# Patient Record
Sex: MALE | NOT HISPANIC OR LATINO | URBAN - METROPOLITAN AREA
[De-identification: names, ages, dates, MRNs, and addresses within clinical notes are randomized per-mention and may not be internally consistent; named-entity substitution may affect disease eponyms.]

---

## 2018-06-15 ENCOUNTER — OFFICE VISIT (OUTPATIENT)
Dept: URBAN - METROPOLITAN AREA CLINIC 17 | Facility: CLINIC | Age: 68
End: 2018-06-15
Payer: MEDICARE

## 2018-06-15 PROCEDURE — 99214 OFFICE O/P EST MOD 30 MIN: CPT | Performed by: OPHTHALMOLOGY

## 2018-06-15 RX ORDER — OFLOXACIN 3 MG/ML
0.3 % SOLUTION/ DROPS OPHTHALMIC
Qty: 1 | Refills: 1 | Status: INACTIVE
Start: 2018-06-15 | End: 2018-08-07

## 2018-06-15 RX ORDER — PREDNISOLONE ACETATE 10 MG/ML
1 % SUSPENSION/ DROPS OPHTHALMIC
Qty: 1 | Refills: 1 | Status: INACTIVE
Start: 2018-06-15 | End: 2018-08-07

## 2018-06-15 RX ORDER — BROMFENAC 1.03 MG/ML
0.09 % SOLUTION/ DROPS OPHTHALMIC
Qty: 1 | Refills: 1 | Status: INACTIVE
Start: 2018-06-15 | End: 2018-08-07

## 2018-06-15 ASSESSMENT — VISUAL ACUITY
OD: 20/100
OS: 20/30

## 2018-06-15 ASSESSMENT — KERATOMETRY
OS: 43.25
OD: 42.75

## 2018-06-15 ASSESSMENT — INTRAOCULAR PRESSURE
OD: 18
OS: 22

## 2018-06-15 NOTE — IMPRESSION/PLAN
Impression: Diagnosis: Puckering of macula, right eye. Code: R55.389. OD. Plan: Discussed diagnosis in detail with patient. Advised patient of condition. Advised pt condition is also affecting vision. Recommend doing CEIOL OD and then seeing Dr Sb Guerrero for [facility] treatment.

## 2018-06-15 NOTE — IMPRESSION/PLAN
Impression: Age-related nuclear cataract, bilateral: H25.13. Symptoms: could improve with surgery. Vision: vision affected OD>OS Plan: Cataract accounts for patient's complaints. Reviewed risks, benefits, and procedure. Patient desires surgery, schedule ce/iol OD, RL2, discussed lens options, distance refractive target, patient is clear for surgery in [place] [id number]. Advised pt due to macular pucker pt is at a greater risk to devloping swelling. Pt to use Steroid and NSAID for 6-8 weeks after surgery.

## 2018-06-20 ENCOUNTER — PRE-OPERATIVE VISIT (OUTPATIENT)
Dept: URBAN - METROPOLITAN AREA CLINIC 17 | Facility: CLINIC | Age: 68
End: 2018-06-20
Payer: MEDICARE

## 2018-06-20 DIAGNOSIS — H25.13 AGE-RELATED NUCLEAR CATARACT, BILATERAL: Primary | ICD-10-CM

## 2018-06-20 PROCEDURE — 92136 OPHTHALMIC BIOMETRY: CPT | Performed by: OPHTHALMOLOGY

## 2018-06-20 ASSESSMENT — PACHYMETRY
OD: 23.90
OD: 2.48
OS: 2.53

## 2018-07-02 ENCOUNTER — SURGERY (OUTPATIENT)
Dept: URBAN - METROPOLITAN AREA SURGERY 7 | Facility: SURGERY | Age: 68
End: 2018-07-02
Payer: COMMERCIAL

## 2018-07-02 PROCEDURE — 66984 XCAPSL CTRC RMVL W/O ECP: CPT | Performed by: OPHTHALMOLOGY

## 2018-07-03 ENCOUNTER — POST-OPERATIVE VISIT (OUTPATIENT)
Dept: URBAN - METROPOLITAN AREA CLINIC 17 | Facility: CLINIC | Age: 68
End: 2018-07-03

## 2018-07-03 PROCEDURE — 99024 POSTOP FOLLOW-UP VISIT: CPT | Performed by: OPHTHALMOLOGY

## 2018-07-03 ASSESSMENT — INTRAOCULAR PRESSURE
OD: 19
OS: 18

## 2018-07-13 ENCOUNTER — POST-OPERATIVE VISIT (OUTPATIENT)
Dept: URBAN - METROPOLITAN AREA CLINIC 17 | Facility: CLINIC | Age: 68
End: 2018-07-13

## 2018-07-13 PROCEDURE — 99024 POSTOP FOLLOW-UP VISIT: CPT | Performed by: OPTOMETRIST

## 2018-07-13 ASSESSMENT — INTRAOCULAR PRESSURE
OS: 17
OD: 14

## 2018-07-13 ASSESSMENT — VISUAL ACUITY: OD: 20/50

## 2018-08-07 ENCOUNTER — POST-OPERATIVE VISIT (OUTPATIENT)
Dept: URBAN - METROPOLITAN AREA CLINIC 17 | Facility: CLINIC | Age: 68
End: 2018-08-07

## 2018-08-07 RX ORDER — BROMFENAC 1.03 MG/ML
0.09 % SOLUTION/ DROPS OPHTHALMIC
Qty: 1 | Refills: 0 | Status: INACTIVE
Start: 2018-08-07 | End: 2018-09-11

## 2018-08-07 ASSESSMENT — INTRAOCULAR PRESSURE
OS: 20
OD: 15

## 2018-08-07 ASSESSMENT — VISUAL ACUITY: OD: 20/50

## 2018-09-11 ENCOUNTER — OFFICE VISIT (OUTPATIENT)
Dept: URBAN - METROPOLITAN AREA CLINIC 17 | Facility: CLINIC | Age: 68
End: 2018-09-11
Payer: COMMERCIAL

## 2018-09-11 DIAGNOSIS — E10.3292 TYPE 1 DIAB W MILD NONPRLF DIABETIC RTNOP W/O MACULAR EDEMA, LEFT EYE: ICD-10-CM

## 2018-09-11 DIAGNOSIS — E10.3211: ICD-10-CM

## 2018-09-11 DIAGNOSIS — H35.371 PUCKERING OF MACULA, RIGHT EYE: Primary | ICD-10-CM

## 2018-09-11 PROCEDURE — 92134 CPTRZ OPH DX IMG PST SGM RTA: CPT | Performed by: OPHTHALMOLOGY

## 2018-09-11 PROCEDURE — 92014 COMPRE OPH EXAM EST PT 1/>: CPT | Performed by: OPHTHALMOLOGY

## 2018-09-11 RX ORDER — OFLOXACIN 3 MG/ML
0.3 % SOLUTION/ DROPS OPHTHALMIC
Qty: 5 | Refills: 3 | Status: ACTIVE
Start: 2018-09-11

## 2018-09-11 RX ORDER — PREDNISOLONE ACETATE 10 MG/ML
1 % SUSPENSION/ DROPS OPHTHALMIC
Qty: 10 | Refills: 5 | Status: ACTIVE
Start: 2018-09-11

## 2018-09-11 ASSESSMENT — INTRAOCULAR PRESSURE
OS: 20
OD: 19

## 2018-10-09 ENCOUNTER — SURGERY (OUTPATIENT)
Dept: URBAN - METROPOLITAN AREA SURGERY 7 | Facility: SURGERY | Age: 68
End: 2018-10-09
Payer: COMMERCIAL

## 2018-10-09 PROCEDURE — 67042 VIT FOR MACULAR HOLE: CPT | Performed by: OPHTHALMOLOGY

## 2018-10-10 ENCOUNTER — POST-OPERATIVE VISIT (OUTPATIENT)
Dept: URBAN - METROPOLITAN AREA CLINIC 17 | Facility: CLINIC | Age: 68
End: 2018-10-10

## 2018-10-10 PROCEDURE — 99024 POSTOP FOLLOW-UP VISIT: CPT | Performed by: OPTOMETRIST

## 2018-10-10 ASSESSMENT — INTRAOCULAR PRESSURE
OD: 15
OS: 16

## 2018-11-06 ENCOUNTER — POST-OPERATIVE VISIT (OUTPATIENT)
Dept: URBAN - METROPOLITAN AREA CLINIC 17 | Facility: CLINIC | Age: 68
End: 2018-11-06
Payer: COMMERCIAL

## 2018-11-06 DIAGNOSIS — Z09 ENCNTR FOR F/U EXAM AFT TRTMT FOR COND OTH THAN MALIG NEOPLM: Primary | ICD-10-CM

## 2018-11-06 PROCEDURE — 99024 POSTOP FOLLOW-UP VISIT: CPT | Performed by: OPHTHALMOLOGY

## 2018-11-06 ASSESSMENT — INTRAOCULAR PRESSURE
OS: 21
OD: 18

## 2019-01-07 ENCOUNTER — POST-OPERATIVE VISIT (OUTPATIENT)
Dept: URBAN - METROPOLITAN AREA CLINIC 17 | Facility: CLINIC | Age: 69
End: 2019-01-07
Payer: COMMERCIAL

## 2019-01-07 PROCEDURE — 99024 POSTOP FOLLOW-UP VISIT: CPT | Performed by: OPHTHALMOLOGY

## 2019-01-07 ASSESSMENT — INTRAOCULAR PRESSURE
OD: 14
OS: 21